# Patient Record
Sex: MALE | Race: WHITE | Employment: FULL TIME | URBAN - METROPOLITAN AREA
[De-identification: names, ages, dates, MRNs, and addresses within clinical notes are randomized per-mention and may not be internally consistent; named-entity substitution may affect disease eponyms.]

---

## 2017-02-14 ENCOUNTER — OFFICE VISIT (OUTPATIENT)
Dept: FAMILY MEDICINE CLINIC | Facility: CLINIC | Age: 32
End: 2017-02-14

## 2017-02-14 VITALS
TEMPERATURE: 97 F | SYSTOLIC BLOOD PRESSURE: 114 MMHG | BODY MASS INDEX: 35 KG/M2 | HEART RATE: 72 BPM | RESPIRATION RATE: 16 BRPM | DIASTOLIC BLOOD PRESSURE: 84 MMHG | WEIGHT: 234 LBS

## 2017-02-14 DIAGNOSIS — J01.90 ACUTE NON-RECURRENT SINUSITIS, UNSPECIFIED LOCATION: Primary | ICD-10-CM

## 2017-02-14 PROCEDURE — 99214 OFFICE O/P EST MOD 30 MIN: CPT | Performed by: FAMILY MEDICINE

## 2017-02-14 RX ORDER — AMOXICILLIN AND CLAVULANATE POTASSIUM 500; 125 MG/1; MG/1
TABLET, FILM COATED ORAL
Qty: 20 TABLET | Refills: 0 | Status: SHIPPED | OUTPATIENT
Start: 2017-02-14 | End: 2017-02-24

## 2017-02-14 NOTE — PROGRESS NOTES
Grupo Hall is a 28year old male. CC:  Patient presents with:  Sinus Problem: per pt      HPI:  The patient has primary complaint of nasal congestion and rhinorrhea that is thick and green for  1 week.  Associated symptoms include bilateral ear muscle use  GI: not examined  PSYCH: alert and oriented x 3; affect appropriate  SKIN: not examined  BREAST: not examined/not applicable  EXTREMITIES: No clubbing, cyanosis or edema  RECTAL: not examined  GENITAL: not examined  LYMPH: no supraclavicular no

## 2017-10-03 ENCOUNTER — HOSPITAL ENCOUNTER (OUTPATIENT)
Dept: GENERAL RADIOLOGY | Age: 32
Discharge: HOME | End: 2017-10-03
Attending: ORTHOPAEDIC SURGERY
Payer: OTHER MISCELLANEOUS

## 2017-10-03 DIAGNOSIS — M25.552 BILATERAL HIP PAIN: ICD-10-CM

## 2017-10-03 DIAGNOSIS — M25.551 BILATERAL HIP PAIN: ICD-10-CM

## 2017-10-03 PROBLEM — S73.191A TEAR OF RIGHT ACETABULAR LABRUM, INITIAL ENCOUNTER: Status: ACTIVE | Noted: 2017-10-03

## 2017-10-03 PROCEDURE — 73522 X-RAY EXAM HIPS BI 3-4 VIEWS: CPT | Performed by: ORTHOPAEDIC SURGERY

## 2017-10-24 PROBLEM — M25.851 FEMOROACETABULAR IMPINGEMENT OF RIGHT HIP: Status: ACTIVE | Noted: 2017-10-24

## 2017-11-01 ENCOUNTER — MED REC SCAN ONLY (OUTPATIENT)
Dept: FAMILY MEDICINE CLINIC | Facility: CLINIC | Age: 32
End: 2017-11-01

## 2017-12-21 PROBLEM — S73.191D TEAR OF RIGHT ACETABULAR LABRUM, SUBSEQUENT ENCOUNTER: Status: ACTIVE | Noted: 2017-10-03

## 2018-01-25 ENCOUNTER — TELEPHONE (OUTPATIENT)
Dept: FAMILY MEDICINE CLINIC | Facility: CLINIC | Age: 33
End: 2018-01-25

## 2018-01-25 DIAGNOSIS — IMO0002 SOLITARY KIDNEY: Primary | ICD-10-CM

## 2018-01-25 NOTE — TELEPHONE ENCOUNTER
Please let patient know or leave message that I spoke with his hip surgeon. He informed me that Deonte Macias will be on Vimovo for about one month after his procedure. This medication is handled by the kidneys.  Given that he has a solitary kidney, we need to lo

## 2018-01-29 ENCOUNTER — TELEPHONE (OUTPATIENT)
Dept: FAMILY MEDICINE CLINIC | Facility: CLINIC | Age: 33
End: 2018-01-29

## 2018-01-29 ENCOUNTER — NURSE ONLY (OUTPATIENT)
Dept: FAMILY MEDICINE CLINIC | Facility: CLINIC | Age: 33
End: 2018-01-29

## 2018-01-29 DIAGNOSIS — IMO0002 SOLITARY KIDNEY: ICD-10-CM

## 2018-01-29 LAB
BUN BLD-MCNC: 18 MG/DL (ref 8–20)
CALCIUM BLD-MCNC: 9.7 MG/DL (ref 8.3–10.3)
CHLORIDE: 104 MMOL/L (ref 101–111)
CO2: 29 MMOL/L (ref 22–32)
CREAT BLD-MCNC: 1.03 MG/DL (ref 0.7–1.3)
GLUCOSE BLD-MCNC: 112 MG/DL (ref 70–99)
POTASSIUM SERPL-SCNC: 4.6 MMOL/L (ref 3.6–5.1)
SODIUM SERPL-SCNC: 140 MMOL/L (ref 136–144)

## 2018-01-29 PROCEDURE — 80048 BASIC METABOLIC PNL TOTAL CA: CPT | Performed by: FAMILY MEDICINE

## 2018-01-29 PROCEDURE — 36415 COLL VENOUS BLD VENIPUNCTURE: CPT | Performed by: FAMILY MEDICINE

## 2018-01-29 NOTE — TELEPHONE ENCOUNTER
Ayush Nichole [address] [facility], [phone] for [address] [place] [address] Pkwy message for Ms. Daron Gironhard if this is a valid work comp claim and if it is still opened/acitve. Waiting her call back.

## 2018-01-29 NOTE — PROGRESS NOTES
Patient presented today for lab draw. 1 mint tube(s) drawn from right ac area x1 with straight needle. Patient tolerated well.

## 2018-02-02 ENCOUNTER — APPOINTMENT (OUTPATIENT)
Dept: GENERAL RADIOLOGY | Facility: HOSPITAL | Age: 33
End: 2018-02-02
Attending: ORTHOPAEDIC SURGERY
Payer: OTHER MISCELLANEOUS

## 2018-02-02 ENCOUNTER — ANESTHESIA (OUTPATIENT)
Dept: SURGERY | Facility: HOSPITAL | Age: 33
End: 2018-02-02
Payer: OTHER MISCELLANEOUS

## 2018-02-02 ENCOUNTER — HOSPITAL ENCOUNTER (OUTPATIENT)
Facility: HOSPITAL | Age: 33
Setting detail: HOSPITAL OUTPATIENT SURGERY
Discharge: HOME | End: 2018-02-02
Attending: ORTHOPAEDIC SURGERY | Admitting: ORTHOPAEDIC SURGERY
Payer: OTHER MISCELLANEOUS

## 2018-02-02 ENCOUNTER — ANESTHESIA EVENT (OUTPATIENT)
Dept: SURGERY | Facility: HOSPITAL | Age: 33
End: 2018-02-02
Payer: OTHER MISCELLANEOUS

## 2018-02-02 ENCOUNTER — SURGERY (OUTPATIENT)
Age: 33
End: 2018-02-02

## 2018-02-02 VITALS
HEIGHT: 69 IN | SYSTOLIC BLOOD PRESSURE: 127 MMHG | HEART RATE: 80 BPM | WEIGHT: 240.31 LBS | DIASTOLIC BLOOD PRESSURE: 80 MMHG | RESPIRATION RATE: 18 BRPM | BODY MASS INDEX: 35.59 KG/M2 | TEMPERATURE: 98 F | OXYGEN SATURATION: 98 %

## 2018-02-02 PROCEDURE — 76001 XR FLUOROSCOPE EXAM >1 HR EXTENSIVE (CPT=76001): CPT | Performed by: ORTHOPAEDIC SURGERY

## 2018-02-02 PROCEDURE — 0QB44ZZ EXCISION OF RIGHT ACETABULUM, PERCUTANEOUS ENDOSCOPIC APPROACH: ICD-10-PCS | Performed by: ORTHOPAEDIC SURGERY

## 2018-02-02 PROCEDURE — 0SQ94ZZ REPAIR RIGHT HIP JOINT, PERCUTANEOUS ENDOSCOPIC APPROACH: ICD-10-PCS | Performed by: ORTHOPAEDIC SURGERY

## 2018-02-02 PROCEDURE — 0QB64ZZ EXCISION OF RIGHT UPPER FEMUR, PERCUTANEOUS ENDOSCOPIC APPROACH: ICD-10-PCS | Performed by: ORTHOPAEDIC SURGERY

## 2018-02-02 RX ORDER — DIAZEPAM 5 MG/1
5 TABLET ORAL ONCE
Status: COMPLETED | OUTPATIENT
Start: 2018-02-02 | End: 2018-02-02

## 2018-02-02 RX ORDER — HYDROCODONE BITARTRATE AND ACETAMINOPHEN 5; 325 MG/1; MG/1
1 TABLET ORAL AS NEEDED
Status: COMPLETED | OUTPATIENT
Start: 2018-02-02 | End: 2018-02-02

## 2018-02-02 RX ORDER — OXYCODONE HYDROCHLORIDE 5 MG/1
5 TABLET ORAL ONCE
Status: COMPLETED | OUTPATIENT
Start: 2018-02-02 | End: 2018-02-02

## 2018-02-02 RX ORDER — SODIUM CHLORIDE, SODIUM LACTATE, POTASSIUM CHLORIDE, CALCIUM CHLORIDE 600; 310; 30; 20 MG/100ML; MG/100ML; MG/100ML; MG/100ML
INJECTION, SOLUTION INTRAVENOUS CONTINUOUS
Status: DISCONTINUED | OUTPATIENT
Start: 2018-02-02 | End: 2018-02-02

## 2018-02-02 RX ORDER — GABAPENTIN 300 MG/1
CAPSULE ORAL
Status: DISCONTINUED
Start: 2018-02-02 | End: 2018-02-02

## 2018-02-02 RX ORDER — MORPHINE SULFATE 10 MG/ML
INJECTION, SOLUTION INTRAMUSCULAR; INTRAVENOUS AS NEEDED
Status: DISCONTINUED | OUTPATIENT
Start: 2018-02-02 | End: 2018-02-02 | Stop reason: HOSPADM

## 2018-02-02 RX ORDER — CEFAZOLIN SODIUM/WATER 2 G/20 ML
2 SYRINGE (ML) INTRAVENOUS ONCE
Status: DISCONTINUED | OUTPATIENT
Start: 2018-02-02 | End: 2018-02-02 | Stop reason: HOSPADM

## 2018-02-02 RX ORDER — OXYCODONE HYDROCHLORIDE 5 MG/1
TABLET ORAL
Status: DISCONTINUED
Start: 2018-02-02 | End: 2018-02-02

## 2018-02-02 RX ORDER — DIAZEPAM 5 MG/1
TABLET ORAL
Status: DISCONTINUED
Start: 2018-02-02 | End: 2018-02-02

## 2018-02-02 RX ORDER — MEPERIDINE HYDROCHLORIDE 25 MG/ML
12.5 INJECTION INTRAMUSCULAR; INTRAVENOUS; SUBCUTANEOUS AS NEEDED
Status: COMPLETED | OUTPATIENT
Start: 2018-02-02 | End: 2018-02-02

## 2018-02-02 RX ORDER — ONDANSETRON 2 MG/ML
4 INJECTION INTRAMUSCULAR; INTRAVENOUS AS NEEDED
Status: DISCONTINUED | OUTPATIENT
Start: 2018-02-02 | End: 2018-02-02

## 2018-02-02 RX ORDER — GABAPENTIN 300 MG/1
300 CAPSULE ORAL ONCE
Status: COMPLETED | OUTPATIENT
Start: 2018-02-02 | End: 2018-02-02

## 2018-02-02 RX ORDER — MAGNESIUM HYDROXIDE 1200 MG/15ML
LIQUID ORAL CONTINUOUS PRN
Status: DISCONTINUED | OUTPATIENT
Start: 2018-02-02 | End: 2018-02-02

## 2018-02-02 RX ORDER — HYDROCODONE BITARTRATE AND ACETAMINOPHEN 5; 325 MG/1; MG/1
2 TABLET ORAL AS NEEDED
Status: COMPLETED | OUTPATIENT
Start: 2018-02-02 | End: 2018-02-02

## 2018-02-02 RX ORDER — DIPHENHYDRAMINE HYDROCHLORIDE 50 MG/ML
12.5 INJECTION INTRAMUSCULAR; INTRAVENOUS AS NEEDED
Status: DISCONTINUED | OUTPATIENT
Start: 2018-02-02 | End: 2018-02-02

## 2018-02-02 RX ORDER — MEPERIDINE HYDROCHLORIDE 25 MG/ML
INJECTION INTRAMUSCULAR; INTRAVENOUS; SUBCUTANEOUS
Status: COMPLETED
Start: 2018-02-02 | End: 2018-02-02

## 2018-02-02 RX ORDER — MIDAZOLAM HYDROCHLORIDE 1 MG/ML
1 INJECTION INTRAMUSCULAR; INTRAVENOUS EVERY 5 MIN PRN
Status: DISCONTINUED | OUTPATIENT
Start: 2018-02-02 | End: 2018-02-02

## 2018-02-02 RX ORDER — NALOXONE HYDROCHLORIDE 0.4 MG/ML
80 INJECTION, SOLUTION INTRAMUSCULAR; INTRAVENOUS; SUBCUTANEOUS AS NEEDED
Status: DISCONTINUED | OUTPATIENT
Start: 2018-02-02 | End: 2018-02-02

## 2018-02-02 RX ORDER — CLONIDINE 100 UG/ML
INJECTION, SOLUTION EPIDURAL AS NEEDED
Status: DISCONTINUED | OUTPATIENT
Start: 2018-02-02 | End: 2018-02-02 | Stop reason: HOSPADM

## 2018-02-02 RX ORDER — MORPHINE SULFATE 2 MG/ML
2 INJECTION, SOLUTION INTRAMUSCULAR; INTRAVENOUS EVERY 5 MIN PRN
Status: DISCONTINUED | OUTPATIENT
Start: 2018-02-02 | End: 2018-02-02

## 2018-02-02 NOTE — OR NURSING
Instructions given. Many questions addressed. Pt has pain med filled and at home. Dr. Real Porter called with additional questions re: medications. Family states that they are now comfortable taking pt. home. Crutches given. Ice packs given.

## 2018-02-02 NOTE — BRIEF OP NOTE
Pre-Operative Diagnosis: FEMOROACETABULAR IMPINGEMENT RIGHT HIP, AND TEAR OF ACETABULAR LABRUM RIGHT HIP       Post-Operative Diagnosis: FEMOROACETABULAR IMPINGEMENT RIGHT HIP, AND TEAR OF ACETABULAR LABRUM RIGHT HIP       Procedure Performed:   Procedu

## 2018-02-02 NOTE — OPERATIVE REPORT
[facility]    PATIENT'S NAME: Jessika Colunga   ATTENDING PHYSICIAN: Anthony Sabillon MD   OPERATING PHYSICIAN: Anthony Sabillon MD   PATIENT ACCOUNT#:   [id number]    LOCATION:  PACU [facility] PACU [address] [facility] #:   [id number]       DATE OF BIRTH: OR and underwent general anesthesia. He received preoperative antibiotics. He was placed on the OR table and his bilateral feet were placed in the traction boots.   There was a well-padded perineal post.  The right hip and thigh were prepped and draped in at approximately the 3 o'clock position and we drilled this in place. We then passed the suture circumferentially around the labrum and tied this down. We then placed 3 other [place] CinchLock anchors at the 2 o'clock, 1 o'clock, and 12 o'clock positions. maximal flexion, internally and externally rotated and noted that there was no residual impingement. We then closed the capsule, both the T portion of the capsulotomy as well as the medial limb, and tied this down.   We then injected 10 mg of morphine and

## 2018-02-02 NOTE — H&P
CHIEF COMPLAINT:  Patient presents with:  Pre-Op Exam: right hip. AHS/labral repair/acetabuloplasty/femoroplasty 2/2/18 @ [facility]        HPI:  Socrates Lee presents for preoperative consultation for his right hip.   In summary, he had a work injury causin angle.  Only mild degenerative changes. No acute bony injury. MRI demonstrates labral tear with para labral cyst        ASSESSMENT:  Bilateral hip PETTY with labral tears, right worse than left     PLAN:  Once again had a long discussion with the patient.

## 2018-02-02 NOTE — ANESTHESIA POSTPROCEDURE EVALUATION
Luis 63 Patient Status:  Hospital Outpatient Surgery   Age/Gender 33year old male MRN [id number]   [facility] SURGERY Attending Vernal Paget, MD   Hosp Day # 0 PCP Rosa Vyas MD       Anesthesia Post-op Note

## 2018-02-02 NOTE — ANESTHESIA PREPROCEDURE EVALUATION
PRE-OP EVALUATION    Patient Name: Drea Chavez    Pre-op Diagnosis: FEMOROACETABULAR IMPINGEMENT RIGHT HIP, AND TEAR OF ACETABULAR LABRUM RIGHT HIP      Procedure(s):  RIGHT HIP ARTHROSCOPY, LABRAL REPAIR, ACETABULOPLASTY, FEMOROPLASTY     Surgeon(s) reviewed.        Lab Results  Component Value Date    01/29/2018   K 4.6 01/29/2018    01/29/2018   CO2 29.0 01/29/2018   BUN 18 01/29/2018   CREATSERUM 1.03 01/29/2018    (H) 01/29/2018   CA 9.7 01/29/2018            Airway      [place]

## 2018-02-07 PROBLEM — Z47.89 ORTHOPEDIC AFTERCARE: Status: ACTIVE | Noted: 2018-02-07

## 2018-03-02 ENCOUNTER — NURSE ONLY (OUTPATIENT)
Dept: FAMILY MEDICINE CLINIC | Facility: CLINIC | Age: 33
End: 2018-03-02

## 2018-03-02 DIAGNOSIS — IMO0002 SOLITARY KIDNEY: ICD-10-CM

## 2018-03-02 LAB
BUN BLD-MCNC: 19 MG/DL (ref 8–20)
CALCIUM BLD-MCNC: 9.8 MG/DL (ref 8.3–10.3)
CHLORIDE: 105 MMOL/L (ref 101–111)
CO2: 23 MMOL/L (ref 22–32)
CREAT BLD-MCNC: 0.99 MG/DL (ref 0.7–1.3)
GLUCOSE BLD-MCNC: 93 MG/DL (ref 70–99)
POTASSIUM SERPL-SCNC: 4.7 MMOL/L (ref 3.6–5.1)
SODIUM SERPL-SCNC: 136 MMOL/L (ref 136–144)

## 2018-03-02 PROCEDURE — 36415 COLL VENOUS BLD VENIPUNCTURE: CPT | Performed by: FAMILY MEDICINE

## 2018-03-02 PROCEDURE — 80048 BASIC METABOLIC PNL TOTAL CA: CPT | Performed by: FAMILY MEDICINE

## 2018-04-20 RX ORDER — PSEUDOEPHEDRINE HCL 120 MG/1
120 TABLET, FILM COATED, EXTENDED RELEASE ORAL 2 TIMES DAILY PRN
Qty: 30 TABLET | Refills: 2 | Status: SHIPPED | OUTPATIENT
Start: 2018-04-20 | End: 2021-02-09

## 2018-08-03 ENCOUNTER — TELEPHONE (OUTPATIENT)
Dept: FAMILY MEDICINE CLINIC | Facility: CLINIC | Age: 33
End: 2018-08-03

## 2018-08-03 ENCOUNTER — OFFICE VISIT (OUTPATIENT)
Dept: FAMILY MEDICINE CLINIC | Facility: CLINIC | Age: 33
End: 2018-08-03
Payer: COMMERCIAL

## 2018-08-03 VITALS
SYSTOLIC BLOOD PRESSURE: 118 MMHG | DIASTOLIC BLOOD PRESSURE: 80 MMHG | TEMPERATURE: 99 F | BODY MASS INDEX: 35 KG/M2 | RESPIRATION RATE: 16 BRPM | OXYGEN SATURATION: 98 % | HEART RATE: 89 BPM | WEIGHT: 235.63 LBS

## 2018-08-03 DIAGNOSIS — R09.81 SINUS CONGESTION: ICD-10-CM

## 2018-08-03 DIAGNOSIS — R53.83 FATIGUE, UNSPECIFIED TYPE: ICD-10-CM

## 2018-08-03 DIAGNOSIS — R21 RASH: Primary | ICD-10-CM

## 2018-08-03 PROCEDURE — 99213 OFFICE O/P EST LOW 20 MIN: CPT | Performed by: FAMILY MEDICINE

## 2018-08-03 RX ORDER — PREDNISONE 10 MG/1
TABLET ORAL
Qty: 28 TABLET | Refills: 0 | Status: SHIPPED | OUTPATIENT
Start: 2018-08-03 | End: 2021-02-09

## 2018-08-03 RX ORDER — AMOXICILLIN AND CLAVULANATE POTASSIUM 875; 125 MG/1; MG/1
1 TABLET, FILM COATED ORAL 2 TIMES DAILY
Qty: 20 TABLET | Refills: 0 | Status: SHIPPED | OUTPATIENT
Start: 2018-08-03 | End: 2018-08-13

## 2018-08-03 RX ORDER — PREDNISONE 10 MG/1
10 TABLET ORAL DAILY
Qty: 28 TABLET | Refills: 0 | Status: SHIPPED | OUTPATIENT
Start: 2018-08-03 | End: 2018-08-03 | Stop reason: CLARIF

## 2018-08-03 NOTE — PROGRESS NOTES
CHIEF COMPLAINT:   Patient presents with:  Rash: legs, left side since Saturday  Sinus Problem  Body ache and/or chills: since Monday       HPI:   Earl Baker is a 29year old male who presents for evaluation of a rash, sinus congestion and sore th Years: 0.00      Smokeless tobacco: Never Used                      Alcohol use: Yes           3.6 oz/week     Standard drinks or equivalent: 6 per week     Comment: social        REVIEW OF SYSTEMS:   GENERAL: feels well otherwise  SKIN: Per HPI.    HE for possible blood work. Patient verbalized understanding. PLAN: Meds as listed below. Comfort measures as described in Patient Instructions. Skin care discussed with patient.      Meds & Refills for this Visit:    Signed Prescriptions Disp Refill

## 2018-08-03 NOTE — TELEPHONE ENCOUNTER
Received fax from [address] [facility] asking for clarification of one of patient's medications. Called pharmacy and spoke with Alpa Galvez. Prednisone has two different set of directions.  Verbal from Dr Sherrie Babinski to cancel this prescription and she will send new correct

## 2018-09-04 ENCOUNTER — OFFICE VISIT (OUTPATIENT)
Dept: FAMILY MEDICINE CLINIC | Facility: CLINIC | Age: 33
End: 2018-09-04
Payer: COMMERCIAL

## 2018-09-04 VITALS
TEMPERATURE: 98 F | DIASTOLIC BLOOD PRESSURE: 82 MMHG | WEIGHT: 236 LBS | BODY MASS INDEX: 34.96 KG/M2 | HEART RATE: 76 BPM | RESPIRATION RATE: 14 BRPM | HEIGHT: 69 IN | SYSTOLIC BLOOD PRESSURE: 130 MMHG

## 2018-09-04 DIAGNOSIS — L03.211 CELLULITIS OF FACE: Primary | ICD-10-CM

## 2018-09-04 DIAGNOSIS — R59.0 PREAURICULAR ADENOPATHY: ICD-10-CM

## 2018-09-04 PROCEDURE — 99214 OFFICE O/P EST MOD 30 MIN: CPT | Performed by: FAMILY MEDICINE

## 2018-09-04 RX ORDER — CEFDINIR 300 MG/1
300 CAPSULE ORAL 2 TIMES DAILY
Qty: 20 CAPSULE | Refills: 0 | Status: SHIPPED | OUTPATIENT
Start: 2018-09-04 | End: 2018-09-14

## 2018-09-04 NOTE — PROGRESS NOTES
Andie Starks is a 29year old male. CC:  Patient presents with:  Bite: per pt, on side of face      HPI:  L temple lesion for 5 days.  Started as a redness and now the area is larger, more tender and he notes some swelling on the L side of the fac lymphadenopathy, thyromegaly or masses  CAR: S1, S2 normal, RRR; no S3, no S4; no click; murmur negative  PULM: clear to auscultation B, no accessory muscle use  GI: not examined  PSYCH: alert and oriented x 3; affect appropriate  SKIN: L temple with a dim

## 2018-09-07 ENCOUNTER — OFFICE VISIT (OUTPATIENT)
Dept: FAMILY MEDICINE CLINIC | Facility: CLINIC | Age: 33
End: 2018-09-07
Payer: COMMERCIAL

## 2018-09-07 VITALS
BODY MASS INDEX: 34 KG/M2 | SYSTOLIC BLOOD PRESSURE: 132 MMHG | WEIGHT: 233 LBS | DIASTOLIC BLOOD PRESSURE: 88 MMHG | TEMPERATURE: 98 F

## 2018-09-07 DIAGNOSIS — B02.9 HERPES ZOSTER WITHOUT COMPLICATION: Primary | ICD-10-CM

## 2018-09-07 PROCEDURE — 99214 OFFICE O/P EST MOD 30 MIN: CPT | Performed by: FAMILY MEDICINE

## 2018-09-07 RX ORDER — VALACYCLOVIR HYDROCHLORIDE 1 G/1
1 TABLET, FILM COATED ORAL 3 TIMES DAILY
Qty: 21 TABLET | Refills: 0 | Status: SHIPPED | OUTPATIENT
Start: 2018-09-07 | End: 2018-09-14

## 2018-09-07 RX ORDER — PREDNISONE 20 MG/1
TABLET ORAL
Qty: 18 TABLET | Refills: 0 | Status: SHIPPED | OUTPATIENT
Start: 2018-09-07 | End: 2018-09-16

## 2018-09-07 NOTE — PROGRESS NOTES
Beryle Session is a 29year old male. CC:  Patient presents with: Follow - Up: per pt      HPI:    F/u cellulitis of the face. Not better on Cefdinir.  He now notes some pain in the L scalp and smaller lesions on the L upper lid and by the L corner patch of microvesicles on erythematous base, PERRLA, EOMI  HENT: vesicular lesion on L soft palate and L anterior tonsillar pillar, B pinnas, external auditory canals and tympanic membranes are normal. L side of face is mildly edematous.    NECK: No lymphad

## 2018-09-11 ENCOUNTER — OFFICE VISIT (OUTPATIENT)
Dept: FAMILY MEDICINE CLINIC | Facility: CLINIC | Age: 33
End: 2018-09-11
Payer: COMMERCIAL

## 2018-09-11 VITALS
RESPIRATION RATE: 14 BRPM | SYSTOLIC BLOOD PRESSURE: 124 MMHG | HEART RATE: 70 BPM | BODY MASS INDEX: 34 KG/M2 | WEIGHT: 233 LBS | TEMPERATURE: 98 F | DIASTOLIC BLOOD PRESSURE: 82 MMHG

## 2018-09-11 DIAGNOSIS — R04.0 EPISTAXIS: ICD-10-CM

## 2018-09-11 DIAGNOSIS — B02.9 HERPES ZOSTER WITHOUT COMPLICATION: Primary | ICD-10-CM

## 2018-09-11 PROCEDURE — 99214 OFFICE O/P EST MOD 30 MIN: CPT | Performed by: FAMILY MEDICINE

## 2018-09-11 NOTE — PROGRESS NOTES
Sidney Tapia is a 29year old male. CC:  Patient presents with: Follow - Up: per pt      HPI:  Here to f/u facial lesions presumed to be Zoster. Initially was treated with oral antibiotics when he had a solitary L temple lesion.  At f/u placed on Smokeless tobacco: Never Used    Alcohol use:  Yes      Alcohol/week: 3.6 oz      Types: 6 Standard drinks or equivalent per week      Comment: social    Drug use: No       ROS:  General: energy level stable  GI: Denies abdominal pain    Vitals: /82

## 2019-03-19 ENCOUNTER — WALK IN (OUTPATIENT)
Dept: URGENT CARE | Age: 34
End: 2019-03-19

## 2019-03-19 VITALS
WEIGHT: 220 LBS | HEART RATE: 90 BPM | OXYGEN SATURATION: 98 % | RESPIRATION RATE: 18 BRPM | DIASTOLIC BLOOD PRESSURE: 68 MMHG | TEMPERATURE: 98.5 F | SYSTOLIC BLOOD PRESSURE: 124 MMHG | BODY MASS INDEX: 32.58 KG/M2 | HEIGHT: 69 IN

## 2019-03-19 DIAGNOSIS — J20.9 ACUTE BRONCHITIS, UNSPECIFIED ORGANISM: Primary | ICD-10-CM

## 2019-03-19 DIAGNOSIS — R68.89 FLU-LIKE SYMPTOMS: ICD-10-CM

## 2019-03-19 PROCEDURE — 99203 OFFICE O/P NEW LOW 30 MIN: CPT | Performed by: NURSE PRACTITIONER

## 2019-03-19 RX ORDER — AZITHROMYCIN 250 MG/1
TABLET, FILM COATED ORAL
Qty: 6 TABLET | Refills: 0 | Status: SHIPPED | OUTPATIENT
Start: 2019-03-19 | End: 2019-03-24

## 2019-03-19 RX ORDER — ALBUTEROL SULFATE 90 UG/1
2 AEROSOL, METERED RESPIRATORY (INHALATION) EVERY 4 HOURS PRN
Qty: 1 INHALER | Refills: 5 | Status: SHIPPED | OUTPATIENT
Start: 2019-03-19

## 2019-03-19 RX ORDER — BENZONATATE 200 MG/1
200 CAPSULE ORAL 3 TIMES DAILY PRN
Qty: 15 CAPSULE | Refills: 0 | Status: SHIPPED | OUTPATIENT
Start: 2019-03-19

## 2019-03-19 SDOH — HEALTH STABILITY: MENTAL HEALTH: HOW OFTEN DO YOU HAVE A DRINK CONTAINING ALCOHOL?: NEVER

## 2019-03-19 ASSESSMENT — ENCOUNTER SYMPTOMS
GASTROINTESTINAL NEGATIVE: 1
FATIGUE: 1
WHEEZING: 0
SHORTNESS OF BREATH: 1
EYES NEGATIVE: 1
CHILLS: 1
COUGH: 1
FEVER: 1

## 2019-07-10 ENCOUNTER — TELEPHONE (OUTPATIENT)
Dept: FAMILY MEDICINE CLINIC | Facility: CLINIC | Age: 34
End: 2019-07-10

## 2019-07-10 NOTE — TELEPHONE ENCOUNTER
Patient advised that the Plan B pill is OTC. Dr. Sonny Zaidi will not write a prescription for it. Patient states that he know that its over the counter and if he gets a prescription then he can use his FSA.  Patient states that Dr. Sonny Zaidi wrote a prescription

## 2019-07-10 NOTE — TELEPHONE ENCOUNTER
Pt called, would like a script for the Plan B pill. Pharmacy [address]. Please call pt at [phone] to leave detailed message at this number.    Pt said his wife has not been successful in contacting her OB/GYN so he would like

## 2020-02-14 ENCOUNTER — TELEPHONE (OUTPATIENT)
Dept: FAMILY MEDICINE CLINIC | Facility: CLINIC | Age: 35
End: 2020-02-14

## 2020-02-14 NOTE — TELEPHONE ENCOUNTER
Left message on patients voice mail with the information per Dr. Roosevelt Morgan. Phone number left for patient to call the office and set up an appointment.

## 2020-02-14 NOTE — TELEPHONE ENCOUNTER
It needs to be looked at. Motrin 200 mg, 3 tabs every 8 hours as needed for pain.   See me tomorrow  Thanks

## 2020-02-14 NOTE — TELEPHONE ENCOUNTER
Patient feels like he has an ear infection- fluid in his ear- ear is popping - painful-ear is red and irritated. Has been trying sudafed to clear up the fluid. [place] [employer].    He is wondering if there is anything that can be called in for

## 2021-02-09 ENCOUNTER — HOSPITAL ENCOUNTER (OUTPATIENT)
Age: 36
Discharge: HOME | End: 2021-02-09
Payer: COMMERCIAL

## 2021-02-09 VITALS
RESPIRATION RATE: 18 BRPM | SYSTOLIC BLOOD PRESSURE: 133 MMHG | OXYGEN SATURATION: 100 % | TEMPERATURE: 99 F | DIASTOLIC BLOOD PRESSURE: 76 MMHG | HEART RATE: 65 BPM

## 2021-02-09 DIAGNOSIS — Z20.822 EXPOSURE TO COVID-19 VIRUS: Primary | ICD-10-CM

## 2021-02-09 LAB — SARS-COV-2 RNA RESP QL NAA+PROBE: NOT DETECTED

## 2021-02-09 PROCEDURE — U0002 COVID-19 LAB TEST NON-CDC: HCPCS | Performed by: NURSE PRACTITIONER

## 2021-02-09 PROCEDURE — 99202 OFFICE O/P NEW SF 15 MIN: CPT | Performed by: NURSE PRACTITIONER

## 2021-02-09 NOTE — ED PROVIDER NOTES
Patient Seen in: Immediate [facility]      History   Patient presents with:  Testing    Stated Complaint: exposed to covid    HPI/Subjective:   29-year-old male presents to the IC requiring a COVID-19 test.  Patient is his wife was +10 days ago mother c ED Triage Vitals [02/09/21 1333]   /76   Pulse 65   Resp 18   Temp 98.5 °F (36.9 °C)   Temp src Temporal   SpO2 100 %   O2 Device None (Room air)       Current:/76   Pulse 65   Temp 98.5 °F (36.9 °C) (Temporal)   Resp 18   SpO2 100%

## 2021-02-09 NOTE — ED INITIAL ASSESSMENT (HPI)
Pt sts wife positive for covid 10 days ago and one child tested positive for covid 6 days ago. Pt wanting to be tested, no symptoms.

## 2021-09-15 ENCOUNTER — TELEPHONE (OUTPATIENT)
Dept: FAMILY MEDICINE CLINIC | Facility: CLINIC | Age: 36
End: 2021-09-15

## 2021-09-15 ENCOUNTER — HOSPITAL ENCOUNTER (OUTPATIENT)
Dept: GENERAL RADIOLOGY | Age: 36
Discharge: HOME | End: 2021-09-15
Attending: FAMILY MEDICINE
Payer: OTHER MISCELLANEOUS

## 2021-09-15 ENCOUNTER — OFFICE VISIT (OUTPATIENT)
Dept: FAMILY MEDICINE CLINIC | Facility: CLINIC | Age: 36
End: 2021-09-15
Payer: COMMERCIAL

## 2021-09-15 VITALS
SYSTOLIC BLOOD PRESSURE: 120 MMHG | HEART RATE: 60 BPM | HEIGHT: 68.5 IN | OXYGEN SATURATION: 98 % | DIASTOLIC BLOOD PRESSURE: 84 MMHG | RESPIRATION RATE: 19 BRPM | TEMPERATURE: 98 F | BODY MASS INDEX: 27.27 KG/M2 | WEIGHT: 182 LBS

## 2021-09-15 DIAGNOSIS — M25.512 LEFT SHOULDER PAIN, UNSPECIFIED CHRONICITY: Primary | ICD-10-CM

## 2021-09-15 DIAGNOSIS — M25.519 NECK AND SHOULDER PAIN: ICD-10-CM

## 2021-09-15 DIAGNOSIS — M25.512 LEFT SHOULDER PAIN, UNSPECIFIED CHRONICITY: ICD-10-CM

## 2021-09-15 DIAGNOSIS — M62.838 TRAPEZIUS MUSCLE SPASM: ICD-10-CM

## 2021-09-15 DIAGNOSIS — M54.2 NECK AND SHOULDER PAIN: ICD-10-CM

## 2021-09-15 PROCEDURE — 72050 X-RAY EXAM NECK SPINE 4/5VWS: CPT | Performed by: FAMILY MEDICINE

## 2021-09-15 PROCEDURE — 3079F DIAST BP 80-89 MM HG: CPT | Performed by: FAMILY MEDICINE

## 2021-09-15 PROCEDURE — 73030 X-RAY EXAM OF SHOULDER: CPT | Performed by: FAMILY MEDICINE

## 2021-09-15 PROCEDURE — 3074F SYST BP LT 130 MM HG: CPT | Performed by: FAMILY MEDICINE

## 2021-09-15 PROCEDURE — 99203 OFFICE O/P NEW LOW 30 MIN: CPT | Performed by: FAMILY MEDICINE

## 2021-09-15 PROCEDURE — 3008F BODY MASS INDEX DOCD: CPT | Performed by: FAMILY MEDICINE

## 2021-09-15 RX ORDER — CYCLOBENZAPRINE HCL 10 MG
10 TABLET ORAL 3 TIMES DAILY PRN
Qty: 30 TABLET | Refills: 0 | Status: SHIPPED | OUTPATIENT
Start: 2021-09-15 | End: 2021-10-05

## 2021-09-15 NOTE — PROGRESS NOTES
Sidney Tapia is a 29year old male. CC:  Patient presents with:  Back Pain: per pt- neck/shoulder area left side      HPI: Has not been seen in over 3 years. L neck, L trap and L shoulder region pain for one week.  Started after doing [employer] tympanic membranes are normal.   NECK: No lymphadenopathy, thyromegaly or masses  CAR: S1, S2 normal, RRR; no S3, no S4; no click; murmur negative  PULM: clear to auscultation B, no accessory muscle use  GI: not examined  PSYCH: alert and oriented x 3; aff

## 2021-09-15 NOTE — TELEPHONE ENCOUNTER
Patient called requesting a work note with lifting restrictions. He will come later today to give us the W/C information.     Please advise # [phone]

## 2021-09-16 ENCOUNTER — TELEPHONE (OUTPATIENT)
Dept: FAMILY MEDICINE CLINIC | Facility: CLINIC | Age: 36
End: 2021-09-16

## 2021-09-16 NOTE — TELEPHONE ENCOUNTER
Pt calling requesting another letter from  stating he needs follow up appointments related to his injury    Pt can be reached at [phone]    Thank you

## 2021-10-13 ENCOUNTER — TELEPHONE (OUTPATIENT)
Dept: FAMILY MEDICINE CLINIC | Facility: CLINIC | Age: 36
End: 2021-10-13

## 2021-10-13 NOTE — TELEPHONE ENCOUNTER
This is the 4th letter request. I now need a formal work restriction form from his employer. 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 I can not keep writing this notes and guessing at what is needed.    Thanks so much

## 2021-10-13 NOTE — TELEPHONE ENCOUNTER
Patient advised of the information per Dr. iSma Long. He states that he does not have anyone that he can reach out to for a form. Patient states that he believes that work comp has reached out to the office but the office has not responded.  Patient advised

## 2021-10-13 NOTE — TELEPHONE ENCOUNTER
Pt needs a letter that not only states his 20lb  lifting restriction, but also states his treatment plan and anything else that needs to be stated for his workers comp.   Pt also inquiring if he needs to schedule a f/u appt following his last PT appt, which

## 2021-10-29 ENCOUNTER — MED REC SCAN ONLY (OUTPATIENT)
Dept: FAMILY MEDICINE CLINIC | Facility: CLINIC | Age: 36
End: 2021-10-29

## 2021-11-02 ENCOUNTER — OFFICE VISIT (OUTPATIENT)
Dept: FAMILY MEDICINE CLINIC | Facility: CLINIC | Age: 36
End: 2021-11-02
Payer: OTHER MISCELLANEOUS

## 2021-11-02 VITALS
BODY MASS INDEX: 28 KG/M2 | DIASTOLIC BLOOD PRESSURE: 86 MMHG | OXYGEN SATURATION: 98 % | TEMPERATURE: 99 F | SYSTOLIC BLOOD PRESSURE: 124 MMHG | WEIGHT: 187 LBS | HEART RATE: 70 BPM

## 2021-11-02 DIAGNOSIS — M25.512 LEFT SHOULDER PAIN, UNSPECIFIED CHRONICITY: Primary | ICD-10-CM

## 2021-11-02 PROCEDURE — 3079F DIAST BP 80-89 MM HG: CPT | Performed by: FAMILY MEDICINE

## 2021-11-02 PROCEDURE — 3074F SYST BP LT 130 MM HG: CPT | Performed by: FAMILY MEDICINE

## 2021-11-02 PROCEDURE — 99213 OFFICE O/P EST LOW 20 MIN: CPT | Performed by: FAMILY MEDICINE

## 2021-11-02 NOTE — PROGRESS NOTES
Jamil Aldrich is a 29year old male. CC:  Patient presents with: Follow - Up: per pt      HPI:    F/u L shoulder pain that occurred after doing [employer] exercises.  He has attended PT at [place] [address]. He is now pain free and ready to get back t cervical spine. Julián Dominion is reversal of normal cervical lordosis which could be positional.           Dictated by (CST): Louise Haddad MD on 9/15/2021 at 11:55 AM       Finalized by (CST): Louise Haddad MD on 9/15/2021 at 11:56 AM         Allergies:  No Know edema  RECTAL: not examined  GENITAL: not examined  LYMPH: no supraclavicular nodes  MUSCULOSKELETAL: normal ambulation  Left Shoulder exam:  ROM: normal  Impingement sign: Neg  Drop arm test :Neg  Lift off test: Neg  Neer test: Neg  Tenderness at bicipita

## 2021-11-04 ENCOUNTER — MED REC SCAN ONLY (OUTPATIENT)
Dept: FAMILY MEDICINE CLINIC | Facility: CLINIC | Age: 36
End: 2021-11-04

## 2024-07-21 ENCOUNTER — OFFICE VISIT (OUTPATIENT)
Dept: FAMILY MEDICINE CLINIC | Facility: CLINIC | Age: 39
End: 2024-07-21
Payer: COMMERCIAL

## 2024-07-21 VITALS
HEART RATE: 82 BPM | RESPIRATION RATE: 16 BRPM | SYSTOLIC BLOOD PRESSURE: 108 MMHG | DIASTOLIC BLOOD PRESSURE: 70 MMHG | TEMPERATURE: 99 F | WEIGHT: 201 LBS | BODY MASS INDEX: 29.77 KG/M2 | OXYGEN SATURATION: 96 % | HEIGHT: 69 IN

## 2024-07-21 DIAGNOSIS — R05.1 ACUTE COUGH: Primary | ICD-10-CM

## 2024-07-21 DIAGNOSIS — R09.81 SINUS CONGESTION: ICD-10-CM

## 2024-07-21 PROCEDURE — 99213 OFFICE O/P EST LOW 20 MIN: CPT | Performed by: NURSE PRACTITIONER

## 2024-07-21 PROCEDURE — 3008F BODY MASS INDEX DOCD: CPT | Performed by: NURSE PRACTITIONER

## 2024-07-21 PROCEDURE — 3078F DIAST BP <80 MM HG: CPT | Performed by: NURSE PRACTITIONER

## 2024-07-21 PROCEDURE — 3074F SYST BP LT 130 MM HG: CPT | Performed by: NURSE PRACTITIONER

## 2024-07-21 RX ORDER — BENZONATATE 200 MG/1
200 CAPSULE ORAL 3 TIMES DAILY PRN
Qty: 20 CAPSULE | Refills: 0 | Status: SHIPPED | OUTPATIENT
Start: 2024-07-21 | End: 2024-07-28

## 2024-07-21 RX ORDER — AZITHROMYCIN 250 MG/1
TABLET, FILM COATED ORAL
Qty: 6 TABLET | Refills: 0 | Status: SHIPPED | OUTPATIENT
Start: 2024-07-21 | End: 2024-07-26

## 2024-07-21 NOTE — PROGRESS NOTES
CHIEF COMPLAINT:     Chief Complaint   Patient presents with    Cough     Thursday morning, body aches, chills maybe 100 fever, cough, congestion  OTC Tylenol, Dayquill, Nyquill       HPI:   Yoshi Russo is a 39 year old male who presents for upper respiratory symptoms for  4 days. Patient reports body aches, chills, tactile temp, productive cough, congestion, fatigue. Symptoms have been persistent since onset.  Treating symptoms with otc.      Current Outpatient Medications   Medication Sig Dispense Refill    azithromycin (ZITHROMAX Z-LELE) 250 MG Oral Tab Take 2 tablets (500 mg total) by mouth daily for 1 day, THEN 1 tablet (250 mg total) daily for 4 days. 6 tablet 0    benzonatate 200 MG Oral Cap Take 1 capsule (200 mg total) by mouth 3 (three) times daily as needed for cough. 20 capsule 0    Multiple Vitamin (MULTI VITAMIN MENS) Oral Tab Take 1 tablet by mouth daily.        Past Medical History:    Allergic rhinitis    Condyloma acuminatum    Solitary kidney    left    Verruca vulgaris      Past Surgical History:   Procedure Laterality Date    Other surgical history      R nephrectomy due to trauma    Other surgical history  02/02/2018    Right Hip Scope, labral repair, Acetabuloplasty, femoralplasty, chondroplasty          Social History     Socioeconomic History    Marital status:    Tobacco Use    Smoking status: Former    Smokeless tobacco: Never   Substance and Sexual Activity    Alcohol use: Yes     Alcohol/week: 6.0 standard drinks of alcohol     Types: 6 Standard drinks or equivalent per week     Comment: social    Drug use: No   Other Topics Concern    Caffeine Concern Yes     Comment: 3 cups coffee per day         REVIEW OF SYSTEMS:   GENERAL: intact appetite  SKIN: no rashes or abnormal skin lesions  HEENT: See HPI  LUNGS: See HPI  CARDIOVASCULAR: denies chest pain or palpitations   GI: denies N/V/C or abdominal pain      EXAM:   /70   Pulse 82   Temp 99.2 °F (37.3 °C)   Resp 16    Ht 5' 9\" (1.753 m)   Wt 201 lb (91.2 kg)   SpO2 96%   BMI 29.68 kg/m²   GENERAL: well developed, well nourished,in no apparent distress  SKIN: no rashes  HEAD: atraumatic, normocephalic.  no tenderness on palpation of sinuses  EYES: conjunctiva clear  EARS: TM's grey, no bulging, no retraction, no fluid, bony landmarks visible  NOSE: Nostrils patent, no nasal discharge, nasal mucosa pink, moist   THROAT: Oral mucosa pink, moist. Posterior pharynx is not erythematous. no exudates.     NECK: Supple, non-tender  LUNGS: clear to auscultation bilaterally, no wheezes or rhonchi. Breathing is non labored. + cough  CARDIO: RRR without murmur  EXTREMITIES: no cyanosis, clubbing or edema  LYMPH:  no cervical lymphadenopathy.    PSYCH: pleasant mood and affect  NEURO: no focal deficits      ASSESSMENT AND PLAN:   Yoshi Russo is a 39 year old male who presents with upper respiratory symptoms that are consistent with    ASSESSMENT:   Encounter Diagnoses   Name Primary?    Acute cough Yes    Sinus congestion        PLAN:  Advised pt sx are likley viral in nature  Pt defers covid testing   Rest, push fluids, supportive care  Comfort care as described in Patient Instructions  Start abx if symptoms persist over next 3-4 days    Meds & Refills for this Visit:  Requested Prescriptions     Signed Prescriptions Disp Refills    azithromycin (ZITHROMAX Z-LELE) 250 MG Oral Tab 6 tablet 0     Sig: Take 2 tablets (500 mg total) by mouth daily for 1 day, THEN 1 tablet (250 mg total) daily for 4 days.    benzonatate 200 MG Oral Cap 20 capsule 0     Sig: Take 1 capsule (200 mg total) by mouth 3 (three) times daily as needed for cough.     Risks, benefits, and side effects of medication explained and discussed.  The patient indicates understanding of these issues and agrees to the plan.  The patient is asked to f/u with PCP if sx's persist or worsen.    There are no Patient Instructions on file for this visit.

## 2025-03-24 ENCOUNTER — TELEPHONE (OUTPATIENT)
Dept: FAMILY MEDICINE CLINIC | Facility: CLINIC | Age: 40
End: 2025-03-24

## 2025-03-24 RX ORDER — PERMETHRIN 50 MG/G
CREAM TOPICAL
Qty: 60 G | Refills: 0 | Status: SHIPPED | OUTPATIENT
Start: 2025-03-24

## 2025-03-24 NOTE — TELEPHONE ENCOUNTER
Please let patient or caregiver know or leave message that:   Permethrin cream sent.  Thanks     Outpatient Medication Detail     Disp Refills Start End    permethrin 5 % External Cream 60 g 0 3/24/2025 --    Sig: Apply from head to soles of feet. Wash of 8-14 hours later.    Sent to pharmacy as: Permethrin 5 % External Cream (Elimite)    E-Prescribing Status: Receipt confirmed by pharmacy (3/24/2025  3:29 PM CDT)      Pharmacy    [address] [phone], [phone]

## 2025-03-24 NOTE — TELEPHONE ENCOUNTER
Patient calling,  son Juarez was evaluated at [facility] by Dr. Carson.      Patient's son received diagnosis for scabies and received received prescription for permethrin 5% topical cream      Requesting prescription for all family members to prevent acquisition. Will document for each patient.      Yoshi Russo [birth year]86  Anastacia Russo [birth year]/22/83  Jimmy Russo 07/27/2012  Ish Russo 12/02/2014     [facility] [address] [place] [address] [address] [place] [address] [phone], [phone] [address] [address]

## 2025-05-24 ENCOUNTER — PATIENT MESSAGE (OUTPATIENT)
Dept: FAMILY MEDICINE CLINIC | Facility: CLINIC | Age: 40
End: 2025-05-24

## 2025-06-09 ENCOUNTER — PATIENT MESSAGE (OUTPATIENT)
Dept: FAMILY MEDICINE CLINIC | Facility: CLINIC | Age: 40
End: 2025-06-09

## 2025-06-09 PROBLEM — F90.8 OTHER SPECIFIED ATTENTION DEFICIT HYPERACTIVITY DISORDER (ADHD): Status: ACTIVE | Noted: 2025-06-09

## 2025-06-09 PROBLEM — M25.851 FEMOROACETABULAR IMPINGEMENT OF RIGHT HIP: Status: RESOLVED | Noted: 2017-10-24 | Resolved: 2025-06-09

## 2025-06-09 PROBLEM — M25.551 BILATERAL HIP PAIN: Status: RESOLVED | Noted: 2017-10-03 | Resolved: 2025-06-09

## 2025-06-09 PROBLEM — Z47.89 ORTHOPEDIC AFTERCARE: Status: RESOLVED | Noted: 2018-02-07 | Resolved: 2025-06-09

## 2025-06-09 PROBLEM — S73.191D TEAR OF RIGHT ACETABULAR LABRUM, SUBSEQUENT ENCOUNTER: Status: RESOLVED | Noted: 2017-10-03 | Resolved: 2025-06-09

## 2025-06-09 PROBLEM — M25.552 BILATERAL HIP PAIN: Status: RESOLVED | Noted: 2017-10-03 | Resolved: 2025-06-09

## 2025-07-01 ENCOUNTER — TELEPHONE (OUTPATIENT)
Dept: FAMILY MEDICINE CLINIC | Facility: CLINIC | Age: 40
End: 2025-07-01

## 2025-07-01 DIAGNOSIS — F90.9 ATTENTION DEFICIT HYPERACTIVITY DISORDER (ADHD), UNSPECIFIED ADHD TYPE: ICD-10-CM

## 2025-07-03 ENCOUNTER — TELEPHONE (OUTPATIENT)
Dept: FAMILY MEDICINE CLINIC | Facility: CLINIC | Age: 40
End: 2025-07-03

## 2025-07-03 RX ORDER — DEXTROAMPHETAMINE SACCHARATE, AMPHETAMINE ASPARTATE, DEXTROAMPHETAMINE SULFATE AND AMPHETAMINE SULFATE 2.5; 2.5; 2.5; 2.5 MG/1; MG/1; MG/1; MG/1
10 TABLET ORAL 2 TIMES DAILY
Qty: 60 TABLET | Refills: 0 | Status: SHIPPED | OUTPATIENT
Start: 2025-07-03

## 2025-08-07 ENCOUNTER — TELEPHONE (OUTPATIENT)
Dept: FAMILY MEDICINE CLINIC | Facility: CLINIC | Age: 40
End: 2025-08-07

## 2025-08-07 DIAGNOSIS — F90.9 ATTENTION DEFICIT HYPERACTIVITY DISORDER (ADHD), UNSPECIFIED ADHD TYPE: ICD-10-CM

## 2025-08-07 RX ORDER — DEXTROAMPHETAMINE SACCHARATE, AMPHETAMINE ASPARTATE, DEXTROAMPHETAMINE SULFATE AND AMPHETAMINE SULFATE 2.5; 2.5; 2.5; 2.5 MG/1; MG/1; MG/1; MG/1
10 TABLET ORAL 2 TIMES DAILY
Qty: 60 TABLET | Refills: 0 | Status: SHIPPED | OUTPATIENT
Start: 2025-08-07 | End: 2025-08-08

## 2025-08-08 ENCOUNTER — TELEPHONE (OUTPATIENT)
Dept: FAMILY MEDICINE CLINIC | Facility: CLINIC | Age: 40
End: 2025-08-08

## 2025-08-08 DIAGNOSIS — F90.9 ATTENTION DEFICIT HYPERACTIVITY DISORDER (ADHD), UNSPECIFIED ADHD TYPE: ICD-10-CM

## 2025-08-08 RX ORDER — DEXTROAMPHETAMINE SACCHARATE, AMPHETAMINE ASPARTATE, DEXTROAMPHETAMINE SULFATE AND AMPHETAMINE SULFATE 2.5; 2.5; 2.5; 2.5 MG/1; MG/1; MG/1; MG/1
10 TABLET ORAL 2 TIMES DAILY
Qty: 60 TABLET | Refills: 0 | Status: SHIPPED | OUTPATIENT
Start: 2025-08-08

## 2025-08-18 ENCOUNTER — TELEMEDICINE (OUTPATIENT)
Dept: FAMILY MEDICINE CLINIC | Facility: CLINIC | Age: 40
End: 2025-08-18

## 2025-08-18 DIAGNOSIS — F90.9 ATTENTION DEFICIT HYPERACTIVITY DISORDER (ADHD), UNSPECIFIED ADHD TYPE: ICD-10-CM

## 2025-08-18 PROCEDURE — 98006 SYNCH AUDIO-VIDEO EST MOD 30: CPT | Performed by: FAMILY MEDICINE

## 2025-08-18 RX ORDER — DEXTROAMPHETAMINE SACCHARATE, AMPHETAMINE ASPARTATE, DEXTROAMPHETAMINE SULFATE AND AMPHETAMINE SULFATE 2.5; 2.5; 2.5; 2.5 MG/1; MG/1; MG/1; MG/1
TABLET ORAL
COMMUNITY
Start: 2025-08-18

## 2025-08-27 ENCOUNTER — TELEPHONE (OUTPATIENT)
Dept: FAMILY MEDICINE CLINIC | Facility: CLINIC | Age: 40
End: 2025-08-27

## 2025-08-27 DIAGNOSIS — F90.9 ATTENTION DEFICIT HYPERACTIVITY DISORDER (ADHD), UNSPECIFIED ADHD TYPE: ICD-10-CM

## 2025-08-27 RX ORDER — DEXTROAMPHETAMINE SACCHARATE, AMPHETAMINE ASPARTATE, DEXTROAMPHETAMINE SULFATE AND AMPHETAMINE SULFATE 5; 5; 5; 5 MG/1; MG/1; MG/1; MG/1
20 TABLET ORAL 2 TIMES DAILY
Qty: 60 TABLET | Refills: 0 | Status: SHIPPED | OUTPATIENT
Start: 2025-08-27

## (undated) DEVICE — PORTAL ENTRY KIT

## (undated) DEVICE — FMS FLUID MANAGEMENT SYSTEM OUTFLOW TUBING WITH ONE-WAY VALVE (FMS VUE OR FMS DUO PLUS): Brand: FMS

## (undated) DEVICE — PHOENIX DRILL BIT: Brand: PHOENIX

## (undated) DEVICE — Device

## (undated) DEVICE — AMBIENT HIPVAC 50 IFS: Brand: AMBIENT

## (undated) DEVICE — GOWN SURG AERO CHROME XXL

## (undated) DEVICE — STERILE POLYISOPRENE POWDER-FREE SURGICAL GLOVES: Brand: PROTEXIS

## (undated) DEVICE — HOOK LOCK LATEX FREE ELASTIC BANDAGE 6INX5YD

## (undated) DEVICE — SAMURAI CURVED BLADE: Brand: SAMURAI

## (undated) DEVICE — SOL  .9 3000ML

## (undated) DEVICE — NANOTACK FLEX DRILL BIT: Brand: NANOTACK FLEX

## (undated) DEVICE — DRAPE C-ARM UNIVERSAL

## (undated) DEVICE — COVER,MAYO STAND,STERILE: Brand: MEDLINE

## (undated) DEVICE — UNDYED BRAIDED (POLYGLACTIN 910), SYNTHETIC ABSORBABLE SUTURE: Brand: COATED VICRYL

## (undated) DEVICE — XL TOMCAT, HIP CUTTER. ARTHROSCOPIC SHAVER BLADE. FOR USE WITH: REF 0375-701-500, 0375-704-500, 0375-708-500. DO NOT USE IF PACKAGE IS DAMAGED, KEEP DRY, KEEP AWAY FROM SUNLIGHT: Brand: FORMULA

## (undated) DEVICE — CHLORAPREP ORANGE TINT 10.5ML

## (undated) DEVICE — COVER,BOOT,FOAM,NON-SKID,HOOK-LOOP,XLG: Brand: MEDLINE INDUSTRIES, INC.

## (undated) DEVICE — CINCHLOCK SS DRILL BIT: Brand: CINCHLOCK

## (undated) DEVICE — HIP PINNING CDS: Brand: MEDLINE INDUSTRIES, INC.

## (undated) DEVICE — 3M™ STERI-DRAPE™ U-DRAPE 1015: Brand: STERI-DRAPE™

## (undated) DEVICE — SLINGSHOT 70 UP: Brand: SLINGSHOT

## (undated) DEVICE — DRAPE HALF 40X58 DYNJP2410

## (undated) DEVICE — TRANSPORT CANNULA 8MM LENGTH 4, 5, 6: Brand: TRANSPORT

## (undated) DEVICE — SHEET,DRAPE,70X100,STERILE: Brand: MEDLINE

## (undated) DEVICE — BOOT INSERT PAD

## (undated) DEVICE — XL, ARTHROSCOPIC SHAVER BLADES, DIAMOND ROUND BUR.  DO NOT RESTERILIZE, DO NOT USE IF PACKAGE IS DAMAGED, KEEP DRY, KEEP AWAY FROM SUNLIGHT: Brand: CROSSBLADE

## (undated) DEVICE — MEDI-VAC NON-CONDUCTIVE SUCTION TUBING: Brand: CARDINAL HEALTH

## (undated) DEVICE — INTEGRATED CASSETTE TUBING, DO NOT USE IF PACKAGE IS DAMAGED: Brand: CROSSFLOW

## (undated) DEVICE — NON-ADHERENT STRIPS,OIL EMULSION: Brand: CURITY

## (undated) DEVICE — PERINEAL POST PAD 1

## (undated) DEVICE — BONE WAX W31G

## (undated) DEVICE — NANOPASS REACH CRESCENT: Brand: NANOPASS

## (undated) DEVICE — #11 STERILE BLADE: Brand: POLYMER COATED BLADES

## (undated) DEVICE — SUTURE ETHILON 3-0 PS-1

## (undated) DEVICE — KENDALL SCD EXPRESS SLEEVES, KNEE LENGTH, MEDIUM: Brand: KENDALL SCD

## (undated) NOTE — LETTER
9/15/2021      RE: Sidney Tapia  : 3/11/1985      To Whom it May Concern,      Sidney Tapia was seen in office 9/15/2021. He may not lift greater the 20 lbs at work due to a work related injury.         Monika Ruiz MD

## (undated) NOTE — LETTER
2021      RE: Earl Baker  : 3/11/1985      To Whom it May Concern,      Earl Baker was seen in office 2021 for follow up of left shoulder pain. He is pain free. He may return to work on 2021 without restrictions.       Re

## (undated) NOTE — LETTER
9/15/2021      RE: Jamil Aldrich  : 3/11/1985      To Whom it May Concern,      Jamil Aldrich was seen in office 9/15/2021 for a work related injury. He is currently undergoing evaluation for the injury.         Carmen Pandey

## (undated) NOTE — LETTER
2021      RE: Shyanne Aguilar  : 3/11/1985      To Whom it May Concern,      Shyanne Aguilar will need follow up visits as it pertains to his work related L shoulder region injury.  The frequency of these follow up visits has yet to be deter